# Patient Record
Sex: MALE | Race: BLACK OR AFRICAN AMERICAN | NOT HISPANIC OR LATINO | Employment: FULL TIME | URBAN - METROPOLITAN AREA
[De-identification: names, ages, dates, MRNs, and addresses within clinical notes are randomized per-mention and may not be internally consistent; named-entity substitution may affect disease eponyms.]

---

## 2021-01-18 ENCOUNTER — IMMUNIZATION (OUTPATIENT)
Dept: VACCINE CLINIC | Facility: HOSPITAL | Age: 61
End: 2021-01-18

## 2021-01-18 PROCEDURE — 0001A: CPT

## 2021-01-18 PROCEDURE — 91300 HC SARSCOV02 VAC 30MCG/0.3ML IM: CPT

## 2021-02-08 ENCOUNTER — IMMUNIZATION (OUTPATIENT)
Dept: VACCINE CLINIC | Facility: HOSPITAL | Age: 61
End: 2021-02-08

## 2021-02-08 PROCEDURE — 91300 HC SARSCOV02 VAC 30MCG/0.3ML IM: CPT | Performed by: INTERNAL MEDICINE

## 2021-02-08 PROCEDURE — 0002A: CPT | Performed by: INTERNAL MEDICINE

## 2022-03-04 PROCEDURE — U0004 COV-19 TEST NON-CDC HGH THRU: HCPCS | Performed by: FAMILY MEDICINE

## 2022-05-24 ENCOUNTER — OFFICE VISIT (OUTPATIENT)
Dept: ORTHOPEDIC SURGERY | Facility: CLINIC | Age: 62
End: 2022-05-24

## 2022-05-24 VITALS
SYSTOLIC BLOOD PRESSURE: 120 MMHG | BODY MASS INDEX: 25.45 KG/M2 | WEIGHT: 192 LBS | HEIGHT: 73 IN | DIASTOLIC BLOOD PRESSURE: 72 MMHG

## 2022-05-24 DIAGNOSIS — M25.511 RIGHT SHOULDER PAIN, UNSPECIFIED CHRONICITY: Primary | ICD-10-CM

## 2022-05-24 DIAGNOSIS — M19.011 ARTHRITIS OF RIGHT ACROMIOCLAVICULAR JOINT: ICD-10-CM

## 2022-05-24 PROCEDURE — 99203 OFFICE O/P NEW LOW 30 MIN: CPT | Performed by: ORTHOPAEDIC SURGERY

## 2022-05-24 NOTE — PROGRESS NOTES
[facility] [place] [facility] Note        Subjective     Pain of the Right Shoulder      HPI      Alvin Bell is a 62 y.o. male who presents with new problem of: right shoulder pain.  Onset: atraumatic and gradual in nature. The issue has been ongoing for 3 month(s). Pain is a 5/10 on the pain scale. Pain is described as dull and aching. Associated symptoms include pain, popping, grinding and stiffness. The pain is worse with any movement of the joint; resting improve the pain. Previous treatments have included: physical therapy.    I have reviewed the following portions of the patient's history:History of Present Illness and review of systems.      Patient is here today for evaluation of his right shoulder.  This has bothered him since January February.  During COVID, he moved his workouts to his home gym rather than going to the gym with a .  He is gradually lost the ability to do push-ups without pain.  He is able to do pull-ups and curls without difficulty.  He has no difficulty at night.  He tried some joint supplement for about a week and this has not helped him very much.  He has no pain at rest.  He says what really got his shoulder flared up was doing flies on his back.  He is able to play golf without difficulty.      History reviewed. No pertinent past medical history.   History reviewed. No pertinent surgical history.   Family History   Problem Relation Age of Onset   • Hypertension Mother    • Diabetes Father      Social History     Socioeconomic History   • Marital status:    Tobacco Use   • Smoking status: Never Smoker   • Smokeless tobacco: Never Used   Vaping Use   • Vaping Use: Never used   Substance and Sexual Activity   • Alcohol use: Yes   • Drug use: Never   • Sexual activity: Defer      No current outpatient medications on file prior to visit.     No current facility-administered medications on file prior to visit.      No Known Allergies       Review of Systems  "  Constitutional: Negative.    HENT: Negative.    Eyes: Negative.    Respiratory: Negative.    Cardiovascular: Negative.    Gastrointestinal: Negative.    Endocrine: Negative.    Genitourinary: Negative.    Musculoskeletal: Positive for arthralgias.   Skin: Negative.    Allergic/Immunologic: Negative.    Neurological: Negative.    Hematological: Negative.    Psychiatric/Behavioral: Negative.         I reviewed the patient's chief complaint, history of present illness, review of systems, past medical history, surgical history, family history, social history, medications and allergy list.        Objective      Physical Exam  /72   Ht 185.4 cm (72.99\")   Wt 87.1 kg (192 lb)   BMI 25.34 kg/m²     Body mass index is 25.34 kg/m².    General  Mental Status - alert  General Appearance - cooperative, well groomed, not in acute distress  Orientation - Oriented X3  Build & Nutrition - well developed and well nourished  Posture - normal posture  Gait - normal gait       Ortho Exam  Musculoskeletal   Upper Extremity   Right Shoulder     Inspection and Palpation:     Medial border scapular tenderness-none    AC Joint Tenderness -none    Sensation is normal    Examination reveals no ecchymosis.        Strength and Tone:    Supraspinatus - 5/5 without pain    External Rotators-5/5 without pain    Infraspinatus - 5/5    Subscapularis - 5/5    Deltoid - 5/5     Range of Motion      RightShoulder:    Internal Rotation: ROM - L4    External Rotation: AROM - 60 degrees    Elevation through flexion: AROM - 140 degrees        Impingement   Right shoulder    Moses-Jay impingement test positive    Neer impingement test positive     Functional Testing   Right shoulder    AC crossover adduction test positive    Speeds test negative    Uppercut test negative    O'Briens test negative    Drop arm sign negative    Apprehension relocation negative      Imaging/Studies  Imaging Results (Last 24 Hours)     Procedure Component Value " Units Date/Time    XR Shoulder 2+ View Right [[id number]] Resulted: 05/24/22 0821     Updated: 05/24/22 0822    Narrative:      Right Shoulder X-Ray    Indication: Pain    Study:  AP, axillary lateral, and scapular Y views    Comparison: None    Findings:  No acute fractures are visualized  No bony lesions are visualized.  Normal soft tissue appearance  AC joint: Severe joint space narrowing  Glenohumeral joint: Minimal joint space narrowing  Acromion type: 3      Impression:    No acute bony abnormalities noted  Type III acromion  Severe AC joint arthritis              Assessment    Assessment:  1. Right shoulder pain, unspecified chronicity    2. Arthritis of right acromioclavicular joint        Plan:  1. Continue over-the-counter medication as needed for discomfort  2. We shared the patient's radiographic findings with him.  He does have a type III acromion and is at high risk of having rotator cuff pathology.  With that being said, I do believe the majority of his pain given his history and subjective complaints are coming from the AC joint and his AC joint arthritis which is notable on his x-ray today as well.  Plan will be for physical therapy to work on cuff strengthening and global shoulder strengthening as well as iontophoresis to the AC joint.  We will recheck him in 4 weeks and if he is a lot better consider modalities versus advanced imaging.        James Fitzpatrick MD  05/24/22  08:38 EDT      Dictated Utilizing Dragon Dictation.

## 2022-06-02 ENCOUNTER — TREATMENT (OUTPATIENT)
Dept: PHYSICAL THERAPY | Facility: CLINIC | Age: 62
End: 2022-06-02

## 2022-06-02 DIAGNOSIS — M25.511 ACUTE PAIN OF RIGHT SHOULDER: Primary | ICD-10-CM

## 2022-06-02 DIAGNOSIS — M25.60 RANGE OF MOTION DEFICIT: ICD-10-CM

## 2022-06-02 DIAGNOSIS — R53.1 WEAKNESS: ICD-10-CM

## 2022-06-02 PROCEDURE — 97162 PT EVAL MOD COMPLEX 30 MIN: CPT | Performed by: PHYSICAL THERAPIST

## 2022-06-02 PROCEDURE — 97110 THERAPEUTIC EXERCISES: CPT | Performed by: PHYSICAL THERAPIST

## 2022-06-02 NOTE — PROGRESS NOTES
Physical Therapy Initial Evaluation and Plan of Care    Patient: Alvin Bell   : 1960  Diagnosis/ICD-10 Code:  No primary diagnosis found.  Referring practitioner: James Fitzpatrick,*  Date of Initial Visit: 2022  Today's Date: 2022  Patient seen for 1 session         Visit Diagnoses:  No diagnosis found.      Subjective Questionnaire: QuickDASH: 18      Subjective Evaluation    History of Present Illness  Mechanism of injury: The pt stated that he has been working out 3x/week for the last decade. He began working out at home at the beginning of the pandemic and has gradually developed R shoulder pain. He noticed at the beginning of the year that he was unable to perform a pushup due to the pain so he sought treatment from a massage therapist and notified his . Unfortunately, he continues to have unchanged pain. He saw Dr. Fitzpatrick who diagnosed AC jt dysfunction.    Pain is worsened with swinging his backpack on his shoulder, with hitting a golf ball, and with any pressing activities. Symptoms are improved with rest. He has attempted to manage pain with massage therapy. He performs yoga once a week and uses a foam roller regularly.     He has discontinued pushups and flies for the last month but does not feel this has improved his pain. He hopes to return to chest strengthening exercises.       Patient Occupation: Works at [employer] -   Pain  Current pain ratin  At best pain ratin  At worst pain ratin  Location: R shoulder   Quality: dull ache  Relieving factors: rest and change in position  Aggravating factors: lifting, overhead activity and outstretched reach  Progression: no change    Social Support  Lives with: spouse    Hand dominance: right    Diagnostic Tests  X-ray: abnormal (severe AC jt OA)    Treatments  No previous or current treatments  Patient Goals  Patient goals for therapy: decreased pain, increased motion, increased strength  and return to sport/leisure activities             Objective          Palpation   Left   No palpable tenderness to the subscapularis and upper trapezius.     Right   No palpable tenderness to the subscapularis and upper trapezius.   Hypertonic in the subscapularis and upper trapezius.     Tenderness     Additional Tenderness Details  No TTP in R shoulder    Active Range of Motion   Left Shoulder   Flexion: 152 degrees   Abduction: 158 degrees   External rotation 0°: 84 degrees   Internal rotation BTB: T7     Right Shoulder   Flexion: 137 degrees   Abduction: 150 degrees   External rotation 0°: 82 degrees   Internal rotation BTB: T7     Strength/Myotome Testing     Left Shoulder     Planes of Motion   Flexion: 5   Abduction: 5   External rotation at 0°: 5   Internal rotation at 0°: 5   Internal rotation at 90°: 5     Right Shoulder     Planes of Motion   Flexion: 4   Abduction: 4+   External rotation at 0°: 4   Internal rotation at 0°: 4   Internal rotation at 90°: 4-     Additional Strength Details  Pain with R shoulder flexion, abduction, and IR at 90 deg    Tests     Right Shoulder   Positive Neer's and passive horizontal adduction.   Negative empty can, full can, Hawkin's and lift-off.           Assessment & Plan     Assessment  Impairments: abnormal muscle firing, abnormal or restricted ROM, impaired physical strength, lacks appropriate home exercise program and pain with function  Functional Limitations: carrying objects, lifting, reaching behind back, reaching overhead and unable to perform repetitive tasks  Assessment details: The patient is a 61 yo male who presented to PT with evolving characteristics of subacute R shoulder pain with low complexity. Signs and symptoms are consistent with AC jt OA and possible underlying cuff pathology. His subscapularis is most likely to be affected, as it assists with the painful activities such as push ups and flies and was painful with activation in the clinic. He has no  TTP. The condition appears to be inflammatory to some degree so he was advised on ice, topical creams, and NSAID use. He was prescribed an HEP for cuff isometrics, CKC AROM, and serratus strengthening. I expect the patient to make a timely recovery with skilled PT intervention.     Prognosis: good    Goals  Plan Goals: Short Term Goals (4 weeks):     1. The patient will be independent and compliant with initial HEP.     2. The patient will report pain at rest 0/10 or less and worst pain 3/10 or less.    3. The patient will display decreased mm tension in the UT.    4.  R shoulder AROM will improve to flex 145 deg, abd 155 deg, ER 82 deg, IR T7 deg.    5. The patient will demonstrate inc strength evidenced by MMT as follows: flex 5/5, abd 5/5, ER 5/5, and IR 5/5.    6. Quick DASH will improve by 11 points or more.         Long Term Goals (8 weeks):     1. The patient will be appropriate for independent management and compliant with progressed HEP.     2. The patient will report pain at rest 0/10 or less and worst pain 1/10 or less.    3. R shoulder AROM will be within 5 degrees of the contralateral side in flex, abd, ER, and IR.    4. The patient will return to work duties and/or ADLs with no limitations due to shoulder pain or dysfunction.    5. The patient will return to recreational and community activities with no limitations due to shoulder pain or dysfunction.      Plan  Therapy options: will be seen for skilled therapy services  Planned modality interventions: cryotherapy, iontophoresis, TENS, electrical stimulation/Russian stimulation and thermotherapy (hydrocollator packs)  Planned therapy interventions: ADL retraining, body mechanics training, flexibility, functional ROM exercises, home exercise program, joint mobilization, manual therapy, neuromuscular re-education, postural training, soft tissue mobilization, strengthening, therapeutic activities and stretching  Frequency: 1x week  Duration in visits:  8  Duration in weeks: 8  Treatment plan discussed with: patient  Plan details: The patient will likely benefit from TE/TA/NMED to improve RC strength, UE proprioception, and scapular mobility. MT will be utilized in addition to stretching for improved GH jt mobility and AROM. Modalities will be used as needed for pain modulation and reduction of swelling. Dry needling as indicated. Consider ionto.        History # of Personal Factors and/or Comorbidities: LOW (0)  Examination of Body System(s): # of elements: LOW (1-2)  Clinical Presentation: EVOLVING  Clinical Decision Making: LOW       Timed:         Manual Therapy:    0     mins  26314;     Therapeutic Exercise:    25     mins  37054;     Neuromuscular Angélica:    0    mins  51225;    Therapeutic Activity:     0     mins  97828;     Gait Trainin     mins  30417;     Ultrasound:     0     mins  22730;    Ionto                               0    mins   95315  Self Care                       0     mins   94096  Canalith Repos    0     mins 68136      Un-Timed:  Electrical Stimulation:    0     mins  29693 (MC );  Dry Needling     0     mins self-pay  Traction     0     mins 85046  Low Eval     20     Mins  91853  Mod Eval     0     Mins  98155  High Eval                       0     Mins  06483        Timed Treatment:   25   mins   Total Treatment:     45   mins          PT: Beni Moreno PT     License Number: [id number]  Electronically signed by Beni Moreno PT, 22, 3:38 PM EDT    Certification Period: 2022 thru 2022  I certify that the therapy services are furnished while this patient is under my care.  The services outlined above are required by this patient, and will be reviewed every 90 days.         Physician Signature:__________________________________________________    PHYSICIAN: James Fitzpatrick MD  NPI: [id number]                                      DATE:      Please sign and return via fax to [email or web address] . Thank you,  [facility].

## 2022-06-09 ENCOUNTER — TREATMENT (OUTPATIENT)
Dept: PHYSICAL THERAPY | Facility: CLINIC | Age: 62
End: 2022-06-09

## 2022-06-09 DIAGNOSIS — R53.1 WEAKNESS: ICD-10-CM

## 2022-06-09 DIAGNOSIS — M25.60 RANGE OF MOTION DEFICIT: ICD-10-CM

## 2022-06-09 DIAGNOSIS — M25.511 ACUTE PAIN OF RIGHT SHOULDER: Primary | ICD-10-CM

## 2022-06-09 PROCEDURE — 97033 APP MDLTY 1+IONTPHRSIS EA 15: CPT | Performed by: PHYSICAL THERAPIST

## 2022-06-09 PROCEDURE — 97140 MANUAL THERAPY 1/> REGIONS: CPT | Performed by: PHYSICAL THERAPIST

## 2022-06-09 PROCEDURE — 97110 THERAPEUTIC EXERCISES: CPT | Performed by: PHYSICAL THERAPIST

## 2022-06-09 NOTE — PROGRESS NOTES
Physical Therapy Daily Treatment Note      Patient: Alvin Bell   : 1960  Referring practitioner: James Fitzpatrick,*  Date of Initial Visit: Type: THERAPY  Noted: 2022  Today's Date: 2022  Patient seen for 2 sessions       Visit Diagnoses:    ICD-10-CM ICD-9-CM   1. Acute pain of right shoulder  M25.511 719.41   2. Weakness  R53.1 780.79   3. Range of motion deficit  M25.60 719.50       Subjective Evaluation    History of Present Illness  Mechanism of injury: The pt stated that he overworked his shoulder this weekend doing yard work and playing golf. He has not attempted to ice or use NSAIDs. He has been compliant with his HEP and feels it is going well, but noted popping and clicking in the shoulder with rows.     Pain  Current pain ratin  Location: R shoulder           Objective   See Exercise, Manual, and Modality Logs for complete treatment.       Assessment & Plan     Assessment    Assessment details: The pt experienced an increase in pain with activity this week but pain had returned to baseline by today. He demonstrated limited ROM secondary to joint stiffness and mm tightness upon presentation but responded very well to MT and stretching and had full AROM by the end of treatment. He was prescribed general cuff strengthening exercises today and was advised to perform high reps at home.     Plan  Plan details: Continue cuff strengthening, MT, and progress SA exercises.           Timed:         Manual Therapy:    25     mins  62539;     Therapeutic Exercise:    25     mins  07723;     Neuromuscular Angélica:    0    mins  01137;    Therapeutic Activity:     0     mins  22864;     Gait Trainin     mins  55781;     Ultrasound:     0     mins  91720;    Ionto                               8    mins   68027  Self Care                       0     mins   78990  Canalith Repos    0     mins 86165      Un-Timed:  Electrical Stimulation:    0     mins  17470 ( );  Dry Needling      0     mins self-pay  Traction     0     mins 54227      Timed Treatment:   58   mins   Total Treatment:     58   mins    Beni Moreno, PT  [place] License: [id number]

## 2022-06-13 ENCOUNTER — TREATMENT (OUTPATIENT)
Dept: PHYSICAL THERAPY | Facility: CLINIC | Age: 62
End: 2022-06-13

## 2022-06-13 DIAGNOSIS — M25.60 RANGE OF MOTION DEFICIT: ICD-10-CM

## 2022-06-13 DIAGNOSIS — M25.511 ACUTE PAIN OF RIGHT SHOULDER: Primary | ICD-10-CM

## 2022-06-13 DIAGNOSIS — R53.1 WEAKNESS: ICD-10-CM

## 2022-06-13 PROCEDURE — 97140 MANUAL THERAPY 1/> REGIONS: CPT | Performed by: PHYSICAL THERAPIST

## 2022-06-13 PROCEDURE — 97110 THERAPEUTIC EXERCISES: CPT | Performed by: PHYSICAL THERAPIST

## 2022-06-13 PROCEDURE — 97033 APP MDLTY 1+IONTPHRSIS EA 15: CPT | Performed by: PHYSICAL THERAPIST

## 2022-06-13 NOTE — PROGRESS NOTES
Physical Therapy Daily Treatment Note      Patient: Alvin Bell   : 1960  Referring practitioner: James Fitzpatrick,*  Date of Initial Visit: Type: THERAPY  Noted: 2022  Today's Date: 2022  Patient seen for 3 sessions       Visit Diagnoses:    ICD-10-CM ICD-9-CM   1. Acute pain of right shoulder  M25.511 719.41   2. Weakness  R53.1 780.79   3. Range of motion deficit  M25.60 719.50       Subjective Evaluation    History of Present Illness  Mechanism of injury: The pt stated that his shoulder pain has been minimal in the last week and reported that he was able to golf and lift weights with little restriction. He did have some discomfort and tightness with more advanced lifts but reported he held off on most overhead activity. He felt he responded well to ionto last visit.     Pain  Current pain ratin  At worst pain ratin  Location: R shoulder           Objective          Active Range of Motion     Right Shoulder   Flexion: 146 degrees   Abduction: 160 degrees       See Exercise, Manual, and Modality Logs for complete treatment.       Assessment & Plan     Assessment    Assessment details: The pt responded well to MT, cuff strengthening, and ionto last visit and saw reduced pain with activity during the week. He continued to exhibit tightness of the posterior corner but stretching and STM resulted in a quick improvement in ROM and reduced mm tension. He was challenged with more advanced cuff and SA strengthening today and was encouraged to increase reps of HEP exercises at home within his tolerance. Ionto was repeated.     Plan  Plan details: Continue progressions of cuff strengthening, MT, and stretching. Consider ionto.          Timed:         Manual Therapy:    15     mins  65225;     Therapeutic Exercise:    25     mins  03813;     Neuromuscular Angélica:    0    mins  23708;    Therapeutic Activity:     0     mins  69092;     Gait Trainin     mins  05551;     Ultrasound:      0     mins  25389;    Ionto                               8    mins   49590  Self Care                       0     mins   86381  Canalith Repos    0     mins 98035      Un-Timed:  Electrical Stimulation:    0     mins  29983 ( );  Dry Needling     0     mins self-pay  Traction     0     mins 28995      Timed Treatment:   48   mins   Total Treatment:     48   mins    Beni Moreno, PT  [place] License: [id number]

## 2022-06-20 ENCOUNTER — TREATMENT (OUTPATIENT)
Dept: PHYSICAL THERAPY | Facility: CLINIC | Age: 62
End: 2022-06-20

## 2022-06-20 DIAGNOSIS — M25.60 RANGE OF MOTION DEFICIT: ICD-10-CM

## 2022-06-20 DIAGNOSIS — M25.511 ACUTE PAIN OF RIGHT SHOULDER: Primary | ICD-10-CM

## 2022-06-20 DIAGNOSIS — R53.1 WEAKNESS: ICD-10-CM

## 2022-06-20 PROCEDURE — 97140 MANUAL THERAPY 1/> REGIONS: CPT | Performed by: PHYSICAL THERAPIST

## 2022-06-20 PROCEDURE — 97110 THERAPEUTIC EXERCISES: CPT | Performed by: PHYSICAL THERAPIST

## 2022-06-20 PROCEDURE — 97033 APP MDLTY 1+IONTPHRSIS EA 15: CPT | Performed by: PHYSICAL THERAPIST

## 2022-06-21 NOTE — PROGRESS NOTES
Physical Therapy Daily Treatment Note      Patient: Alvin Bell   : 1960  Referring practitioner: James Fitzpatrick,*  Date of Initial Visit: Type: THERAPY  Noted: 2022  Today's Date: 2022  Patient seen for 4 sessions       Visit Diagnoses:    ICD-10-CM ICD-9-CM   1. Acute pain of right shoulder  M25.511 719.41   2. Weakness  R53.1 780.79   3. Range of motion deficit  M25.60 719.50       Subjective Evaluation    History of Present Illness  Mechanism of injury: The pt stated that his shoulder had been feeling much better until last night when he tried to lift a fire pit. He experienced a painful pop in his shoulder but stated that pain dissipated by the morning. He was able to complete his personal training session today with no limitations. He continues to report pain with abduction over shoulder height.     Pain  Current pain ratin  Location: R shoulder           Objective   See Exercise, Manual, and Modality Logs for complete treatment.       Assessment & Plan     Assessment    Assessment details: The pt experienced an exacerbation of pain with lifting yesterday but this had returned to baseline by today. He tolerated overhead strengthening well with no complaints of pain in the clinic and should continue to see improved pain and function with ongoing strengthening. Ionto was applied to the AC joint.     Plan  Plan details: Assess response to ionto. Progress cuff strengthening through full ROM as tolerated.          Timed:         Manual Therapy:    15     mins  26409;     Therapeutic Exercise:    30     mins  26841;     Neuromuscular Angélica:    0    mins  55702;    Therapeutic Activity:     0     mins  58495;     Gait Trainin     mins  70342;     Ultrasound:     0     mins  99119;    Ionto                               8    mins   74479  Self Care                       0     mins   49768  Canalith Repos    0     mins 23310      Un-Timed:  Electrical Stimulation:    0     mins   67812 ( );  Dry Needling     0     mins self-pay  Traction     0     mins 00071      Timed Treatment:   53   mins   Total Treatment:     53   mins    Beni Moreno, PT  [place] License: [id number]

## 2022-06-27 ENCOUNTER — TREATMENT (OUTPATIENT)
Dept: PHYSICAL THERAPY | Facility: CLINIC | Age: 62
End: 2022-06-27

## 2022-06-27 DIAGNOSIS — M25.60 RANGE OF MOTION DEFICIT: ICD-10-CM

## 2022-06-27 DIAGNOSIS — M25.511 ACUTE PAIN OF RIGHT SHOULDER: Primary | ICD-10-CM

## 2022-06-27 DIAGNOSIS — R53.1 WEAKNESS: ICD-10-CM

## 2022-06-27 PROCEDURE — 97110 THERAPEUTIC EXERCISES: CPT | Performed by: PHYSICAL THERAPIST

## 2022-06-27 PROCEDURE — 97140 MANUAL THERAPY 1/> REGIONS: CPT | Performed by: PHYSICAL THERAPIST

## 2022-06-27 NOTE — PROGRESS NOTES
Physical Therapy Re Certification Of Plan of Care  Patient: Alvin Bell   : 1960  Diagnosis/ICD-10 Code:  Acute pain of right shoulder [M25.511]  Referring practitioner: James Fitzpatrick,*  Date of Initial Visit: Type: THERAPY  Noted: 2022  Today's Date: 2022  Patient seen for 5 sessions         Visit Diagnoses:    ICD-10-CM ICD-9-CM   1. Acute pain of right shoulder  M25.511 719.41   2. Weakness  R53.1 780.79   3. Range of motion deficit  M25.60 719.50       Subjective Questionnaire: QuickDASH: 15  Clinical Progress: improved  Home Program Compliance: Yes  Treatment has included: therapeutic exercise, neuromuscular re-education, manual therapy, therapeutic activity and iontophoresis      Subjective Evaluation    History of Present Illness  Mechanism of injury: The pt stated that his shoulder has not bothered him much in the last week and he has tolerated personal training sessions well. He had some pain with [place] his luggage [place] the airport over the weekend. He feels he is about 15% better compared to 1 month ago. He continues to report pain with reaching behind him into extension and with overhead activity. He will be seeing Dr. Fitzpatrick tomorrow.     Pain  Current pain ratin  At worst pain rating: 3  Location: R shoulder             Objective          Palpation   Left   No palpable tenderness to the subscapularis and upper trapezius.     Right   No palpable tenderness to the subscapularis and upper trapezius.   Hypertonic in the subscapularis and upper trapezius.     Tenderness     Additional Tenderness Details  No TTP in R shoulder    Active Range of Motion   Left Shoulder   Flexion: 152 degrees   Abduction: 158 degrees   External rotation 0°: 84 degrees   Internal rotation BTB: T7     Right Shoulder   Flexion: 155 degrees   Abduction: 160 degrees   External rotation 0°: 82 degrees   Internal rotation BTB: T7     Strength/Myotome Testing     Left Shoulder     Planes of  Motion   Flexion: 5   Abduction: 5   External rotation at 0°: 5   Internal rotation at 0°: 5   Internal rotation at 90°: 5     Right Shoulder     Planes of Motion   Flexion: 5   Abduction: 5   External rotation at 0°: 4+   Internal rotation at 0°: 4   Internal rotation at 90°: 4-     Additional Strength Details  Pain with R shoulder flexion, abduction, and IR at 90 deg    Tests     Right Shoulder   Positive lift-off and passive horizontal adduction.   Negative empty can, full can, Hawkin's, internal rotation lag sign and Neer's.           Assessment & Plan     Assessment  Impairments: abnormal muscle firing, abnormal or restricted ROM, impaired physical strength, lacks appropriate home exercise program and pain with function  Functional Limitations: carrying objects, lifting, reaching behind back, reaching overhead and unable to perform repetitive tasks  Assessment details: The patient has made fair progress with initial PT interventions but he continues to report pain with overhead activity, reaching into extension, and with pec strengthening exercises. Symptoms remain consistent with subscapularis pathology along with local irritation to the AC joint. He has responded well in the short term to ionto over the AC joint and may benefit from additional anti-inflammatory intervention moving forward. The subscap is weak and irritable, particularly in a 90/90 testing position, and has not been challenged much with exercise thus far due to complaints of pain. AROM has improved but he continues to display tightness of the posterior corner that is quickly improved with MT and stretching. He may benefit from a period of rest if symptoms persist, as he has remained active with personal training along with HEP performance and may be overworking the shoulder.   Prognosis: good    Goals  Plan Goals: Short Term Goals (4 weeks):     1. The patient will be independent and compliant with initial HEP. Met    2. The patient will  report pain at rest 0/10 or less and worst pain 3/10 or less. Met    3. The patient will display decreased mm tension in the UT. Ongoing     4.  R shoulder AROM will improve to flex 145 deg, abd 155 deg, ER 82 deg, IR T7 deg. Met     5. The patient will demonstrate inc strength evidenced by MMT as follows: flex 5/5, abd 5/5, ER 5/5, and IR 5/5. Ongoing     6. Quick DASH will improve by 11 points or more. Ongoing         Long Term Goals (8 weeks):     1. The patient will be appropriate for independent management and compliant with progressed HEP. Ongoing    2. The patient will report pain at rest 0/10 or less and worst pain 1/10 or less. Ongoing    3. R shoulder AROM will be within 5 degrees of the contralateral side in flex, abd, ER, and IR. Met    4. The patient will return to work duties and/or ADLs with no limitations due to shoulder pain or dysfunction. Ongoing    5. The patient will return to recreational and community activities with no limitations due to shoulder pain or dysfunction. Ongoing       Plan  Therapy options: will be seen for skilled therapy services  Planned modality interventions: cryotherapy, iontophoresis, TENS, electrical stimulation/Russian stimulation and thermotherapy (hydrocollator packs)  Planned therapy interventions: ADL retraining, body mechanics training, flexibility, functional ROM exercises, home exercise program, joint mobilization, manual therapy, neuromuscular re-education, postural training, soft tissue mobilization, strengthening, therapeutic activities and stretching  Frequency: 1x week  Duration in visits: 8  Duration in weeks: 8  Treatment plan discussed with: patient  Plan details: Assess response to rest and resume strengthening of the cuff through full range. Begin subscap training.           Recommendations: Continue with recommendations return to ortho  Timeframe: 1 month  Prognosis to achieve goals: fair      Timed:         Manual Therapy:    20     mins  28940;      Therapeutic Exercise:    25     mins  69783;     Neuromuscular Angélica:    0    mins  34618;    Therapeutic Activity:     0     mins  26026;     Gait Trainin     mins  32059;     Ultrasound:     0     mins  14248;    Ionto                               0    mins   98733  Self Care                       0     mins   47279  Canalith Repos    0     mins 11872      Un-Timed:  Electrical Stimulation:    0     mins  02848 ( );  Dry Needling     0     mins self-pay  Traction     0     mins 77730  Re-Eval                           0    mins  00630      Timed Treatment:   45   mins   Total Treatment:     45   mins          PT: Beni Moreno PT     [place] License:  [id number]    Electronically signed by Beni Moreno PT, 22, 3:34 PM EDT    Certification Period: 2022 thru 2022  I certify that the therapy services are furnished while this patient is under my care.  The services outlined above are required by this patient, and will be reviewed every 90 days.         Physician Signature:__________________________________________________    PHYSICIAN: James Fitzpatrick MD  NPI: [id number]                                      DATE:  :     Please sign and return via fax to [email or web address] . Thank you, [facility]

## 2022-06-28 ENCOUNTER — OFFICE VISIT (OUTPATIENT)
Dept: ORTHOPEDIC SURGERY | Facility: CLINIC | Age: 62
End: 2022-06-28

## 2022-06-28 VITALS
BODY MASS INDEX: 25.31 KG/M2 | SYSTOLIC BLOOD PRESSURE: 160 MMHG | WEIGHT: 191 LBS | HEIGHT: 73 IN | DIASTOLIC BLOOD PRESSURE: 95 MMHG

## 2022-06-28 DIAGNOSIS — M25.511 RIGHT SHOULDER PAIN, UNSPECIFIED CHRONICITY: Primary | ICD-10-CM

## 2022-06-28 DIAGNOSIS — M19.011 ARTHRITIS OF RIGHT ACROMIOCLAVICULAR JOINT: ICD-10-CM

## 2022-06-28 PROCEDURE — 99213 OFFICE O/P EST LOW 20 MIN: CPT | Performed by: ORTHOPAEDIC SURGERY

## 2022-06-28 NOTE — PROGRESS NOTES
"    [facility] [place] Orthopaedic Surgery Clinic Note        Subjective     CC: Follow-up (5 week recheck - Arthritis of right acromioclavicular joint )      MARIA EUGENIA Bell is a 62 y.o. male.  Patient returns for follow-up after his right shoulder.  He has been doing physical therapy and he continues to workout.  He says he is not really a lot much better or different.  Continues to hurt at the anterior aspect of the shoulder.  Has not been able to do push-ups.    Overall, patient's symptoms are as above.    ROS:    Constiutional:Pt denies fever, chills, nausea, or vomiting.  MSK:as above        Objective      Past Medical History  History reviewed. No pertinent past medical history.      Physical Exam  /95   Ht 185.4 cm (72.99\")   Wt 86.6 kg (191 lb)   BMI 25.20 kg/m²     Body mass index is 25.2 kg/m².    Patient is well nourished and well developed.        Ortho Exam  Musculoskeletal   Upper Extremity   Right Shoulder     Inspection and Palpation:     Medial border scapular tenderness-none    AC Joint Tenderness -mild to moderate    Sensation is normal    Examination reveals no ecchymosis.        Strength and Tone:    Supraspinatus -4-5 with pain    External Rotators-5/5    Infraspinatus - 5/5    Subscapularis - 5/5    Deltoid - 5/5     Range of Motion      RightShoulder:    Internal Rotation: ROM - L4    External Rotation: AROM - 60 degrees    Elevation through flexion: AROM - 140 degrees        Impingement   Right shoulder    Moses-Jay impingement test positive    Neer impingement test positive     Functional Testing   Right shoulder    AC crossover adduction test positive    Speeds test negative    Uppercut test negative    O'Briens test negative    Drop arm sign negative    Apprehension relocation negative      Imaging/Labs/EMG Reviewed:  Imaging Results (Last 24 Hours)     ** No results found for the last 24 hours. **            Assessment    Assessment:  1. Right shoulder pain, unspecified " chronicity    2. Arthritis of right acromioclavicular joint        Plan:  1. Recommend over the counter anti-inflammatories for pain and/or swelling  2. Right shoulder pain in the face of underlying AC arthritis--patient's examination and history are concerning for rotator cuff pathology.  Furthermore we know he has a fairly degenerative AC joint.  We will get an MRI of his right shoulder and I will see him back to review that study.  Pay close attention to the anterior structures.      James Fitzpatrick MD  06/28/22  15:16 EDT      Dictated Utilizing Dragon Dictation.

## 2022-07-13 ENCOUNTER — TREATMENT (OUTPATIENT)
Dept: PHYSICAL THERAPY | Facility: CLINIC | Age: 62
End: 2022-07-13

## 2022-07-13 DIAGNOSIS — M25.511 ACUTE PAIN OF RIGHT SHOULDER: Primary | ICD-10-CM

## 2022-07-13 DIAGNOSIS — R53.1 WEAKNESS: ICD-10-CM

## 2022-07-13 DIAGNOSIS — M25.60 RANGE OF MOTION DEFICIT: ICD-10-CM

## 2022-07-13 PROCEDURE — 97140 MANUAL THERAPY 1/> REGIONS: CPT | Performed by: PHYSICAL THERAPIST

## 2022-07-13 PROCEDURE — 97110 THERAPEUTIC EXERCISES: CPT | Performed by: PHYSICAL THERAPIST

## 2022-07-13 NOTE — PROGRESS NOTES
Physical Therapy Daily Treatment Note      Patient: Alvin Bell   : 1960  Referring practitioner: James Fitzpatrick,*  Date of Initial Visit: Type: THERAPY  Noted: 2022  Today's Date: 2022  Patient seen for 6 sessions       Visit Diagnoses:    ICD-10-CM ICD-9-CM   1. Acute pain of right shoulder  M25.511 719.41   2. Weakness  R53.1 780.79   3. Range of motion deficit  M25.60 719.50       Subjective Evaluation    History of Present Illness  Mechanism of injury: The pt stated that he was diagnosed with COVID shortly after his last visit and has been unable to exercise for the last two weeks as a result. He took NSAIDs and Tylenol as needed for his COVID symptoms and reported his shoulder was pain-free on those days. Otherwise, his shoulder pain has remained the same. He returned to Dr. Fitzpatrick and had an MRI scheduled for the first week of August. He has not yet returned to HEP exercise.    Pain  Current pain rating: 3  Location: R shoulder           Objective          Palpation     Right   Hypertonic in the subscapularis. Tenderness of the subscapularis.     Tenderness     Right Shoulder  Tenderness in the subscapularis tendon.     Strength/Myotome Testing     Right Shoulder     Isolated Muscles   Subscapularis: 4-       See Exercise, Manual, and Modality Logs for complete treatment.       Assessment & Plan     Assessment    Assessment details: The pt had COVID and was forced to rest for 2 weeks due to not feeling well. He did not notice any improvement in shoulder pain from rest alone but responded well to NSAID and Tylenol use, indicative of an inflammatory pathology. He continues to display TTP and weakness in the subscap so several isometric exercises were performed through various ranges today along with a single concentric shoulder IR exercises at 90/90. He will be having an MRI in a few weeks and prefers to manage independently with his HEP until that time. He was advised to take  rest days between the days her performs his HEP.     Plan  Plan details: POC as determined by ortho following MRI.           Timed:         Manual Therapy:    10     mins  63412;     Therapeutic Exercise:    30     mins  11775;     Neuromuscular Angélica:    0    mins  73505;    Therapeutic Activity:     0     mins  25900;     Gait Trainin     mins  37520;     Ultrasound:     0     mins  33915;    Ionto                               0    mins   09714  Self Care                       0     mins   07978  Canalith Repos    0     mins 70045      Un-Timed:  Electrical Stimulation:    0     mins  80844 ( );  Dry Needling     0     mins self-pay  Traction     0     mins 46829      Timed Treatment:   40   mins   Total Treatment:     40   mins    Beni Moreno, PT  [place] License: [id number]

## 2022-08-02 ENCOUNTER — HOSPITAL ENCOUNTER (OUTPATIENT)
Dept: MRI IMAGING | Facility: HOSPITAL | Age: 62
Discharge: HOME | End: 2022-08-02
Admitting: ORTHOPAEDIC SURGERY

## 2022-08-02 DIAGNOSIS — M25.511 RIGHT SHOULDER PAIN, UNSPECIFIED CHRONICITY: ICD-10-CM

## 2022-08-02 DIAGNOSIS — M19.011 ARTHRITIS OF RIGHT ACROMIOCLAVICULAR JOINT: ICD-10-CM

## 2022-08-02 PROCEDURE — 73221 MRI JOINT UPR EXTREM W/O DYE: CPT

## 2022-08-09 ENCOUNTER — TELEPHONE (OUTPATIENT)
Dept: ORTHOPEDIC SURGERY | Facility: CLINIC | Age: 62
End: 2022-08-09

## 2022-08-23 ENCOUNTER — OFFICE VISIT (OUTPATIENT)
Dept: ORTHOPEDIC SURGERY | Facility: CLINIC | Age: 62
End: 2022-08-23

## 2022-08-23 ENCOUNTER — TELEPHONE (OUTPATIENT)
Dept: ORTHOPEDIC SURGERY | Facility: CLINIC | Age: 62
End: 2022-08-23

## 2022-08-23 VITALS
SYSTOLIC BLOOD PRESSURE: 128 MMHG | WEIGHT: 189.4 LBS | HEIGHT: 73 IN | BODY MASS INDEX: 25.1 KG/M2 | DIASTOLIC BLOOD PRESSURE: 78 MMHG

## 2022-08-23 DIAGNOSIS — M25.511 RIGHT SHOULDER PAIN, UNSPECIFIED CHRONICITY: Primary | ICD-10-CM

## 2022-08-23 DIAGNOSIS — M75.111 INCOMPLETE TEAR OF RIGHT ROTATOR CUFF, UNSPECIFIED WHETHER TRAUMATIC: ICD-10-CM

## 2022-08-23 DIAGNOSIS — M19.011 ARTHRITIS OF RIGHT ACROMIOCLAVICULAR JOINT: ICD-10-CM

## 2022-08-23 DIAGNOSIS — M75.20 TENDINITIS OF LONG HEAD OF BICEPS: ICD-10-CM

## 2022-08-23 PROCEDURE — 99213 OFFICE O/P EST LOW 20 MIN: CPT | Performed by: ORTHOPAEDIC SURGERY

## 2022-08-23 NOTE — TELEPHONE ENCOUNTER
Patient needs a 2 month follow up for his right shoulder.  I attempted to call, but not released to leave a voicemail.  When he calls back, please schedule him for 2 month follow up from 08.23.2022.      Thank you,    Lisa MARES(R)

## 2022-08-23 NOTE — PROGRESS NOTES
"    [facility] [place] Orthopaedic Surgery Clinic Note        Subjective     CC: Follow-up (MRI follow up -- MRI done 8/2/22 )      MARIA EUGENIA Bell is a 62 y.o. male.  Patient returns after the MRI of his right shoulder.  Says overall, he has not worked out much lately and the shoulder has been feeling better.  He tells me he hurt his low back working out a few weeks ago.  This is kept him out of the gym.    Overall, patient's symptoms are as above.    ROS:    Constiutional:Pt denies fever, chills, nausea, or vomiting.  MSK:as above        Objective      Past Medical History  Past Medical History:   Diagnosis Date   • Frozen shoulder Left shoulder 2017   • Periarthritis of shoulder Right Shoulder 2022         Physical Exam  /78   Ht 185.4 cm (72.99\")   Wt 85.9 kg (189 lb 6.4 oz)   BMI 24.99 kg/m²     Body mass index is 24.99 kg/m².    Patient is well nourished and well developed.        Ortho Exam  Musculoskeletal   Upper Extremity   Right Shoulder       Strength and Tone:    Supraspinatus -4 out of 5 with some pain    External Rotators-5/5    Infraspinatus - 5/5    Subscapularis - 5/5    Deltoid - 5/5     Range of Motion      Right Shoulder:    Internal Rotation: ROM - L4    External Rotation: AROM - 70 degrees    Elevation through flexion: AROM - 140 degrees     AC joint:  non tender to palpation    AC joint:  negative crossover      Imaging/Labs/EMG Reviewed:  Imaging Results (Last 24 Hours)     ** No results found for the last 24 hours. **        MRI Shoulder Right Without Contrast  Narrative: EXAMINATION: MRI SHOULDER RIGHT WO CONTRAST-      INDICATION: Shoulder pain, rotator cuff disorder suspected, xray done;  M25.511-Pain in right shoulder; M19.011-Primary osteoarthritis, right  shoulder     TECHNIQUE: MRI of the right shoulder was obtained using standard imaging  sequences in three orthogonal imaging planes. No intravenous or  intra-articular contrast was used.     COMPARISON: Right shoulder radiographs " 5/24/2022     FINDINGS:      There is questionable low-grade articular-sided fraying of the anterior  mid distal fibers of the supraspinatus tendon just proximal to the  footprint (series 4 image 12). There is moderate-grade focal  interstitial tearing of the junctional fibers of the supraspinatus and  infraspinatus and anterior fibers of the infraspinatus tendon at the  footprint (series 8 image 13). There is tendinosis and questionable  low-grade articular-sided fraying of the superior and mid distal fibers  of the subscapularis tendon at the footprint (series 6 image 7 and  series 3 image 13).     The long head of biceps tendon is intact and normally positioned within  the intertubercular groove. There is mild increased central signal of  the intra-articular segment compatible with some tendinosis, without  evidence of discrete tear.     Normal alignment of the glenohumeral joint with a physiologic amount of  joint fluid. The glenohumeral articular cartilage is grossly preserved  without evidence of high-grade chondral loss, subchondral cystic change,  or subchondral marrow edema. There is some degenerative signal change of  the anterosuperior labrum.     There is moderate osteoarthritic change of the acromioclavicular joint  characterized by joint space loss, subchondral cystic change,  osteophytic change, and mild capsular hypertrophy. There is marrow edema  within the distal clavicle greater than acromion (series 4 image 9 and  series 3 image 4). A type II acromion is noted. Proliferative  degenerative changes of the AC joint exerts mild mass effect on the  underlying supraspinatus (series 4 image 11 and series 6 image 9). No  significant subacromial-subdeltoid bursal fluid. No os acromiale.     Impression:    Moderate osteoarthritic change of the acromial clavicular joint. There  is conspicuous marrow edema of the distal clavicle greater than acromion  about the AC joint, presumably reactive marrow edema from  degenerative  change although early distal clavicular osteolysis could have similar  appearance and correlate for trauma or repetitive stress/overuse.     Focal moderate-grade interstitial tearing of the junctional fibers of  the supraspinatus and infraspinatus tendon at the footprint. There may  be low-grade fraying of the subscapularis and supraspinatus tendons.     Mild intra-articular long head of biceps tendinosis.     This report was finalized on 8/3/2022 10:50 AM by Jamal Dykes MD.       We have reviewed images and report of the MRI above.  Patient has a couple of cuts on the coronal images where there is a greater than 75% partial-thickness cuff tear and then on the next cut it appears to be 50% involved.  These are the only issues that I see with the supraspinatus.  Subscap appears okay.  Long head of the biceps shows just a little bit of fluid.  There is significant edema at the AC joint.    Assessment    Assessment:  1. Right shoulder pain, unspecified chronicity    2. Arthritis of right acromioclavicular joint    3. Incomplete tear of right rotator cuff, unspecified whether traumatic    4. Tendinitis of long head of biceps        Plan:  1. Recommend over the counter anti-inflammatories for pain and/or swelling  2. Chronic right shoulder pain with AC joint arthritis and partial-thickness rotator cuff tear--size of the partial-thickness tear is overall very small and I think this can be managed nonoperatively.  We will hold off on further injections for now given how well he is doing.  We have counseled him about what he should and should not do in the gym.  I will see him back in a couple months we will see how he is doing overall.      James Fitzpatrick MD  08/23/22  17:31 EDT      Dictated Utilizing Dragon Dictation.

## 2022-09-30 ENCOUNTER — IMMUNIZATION (OUTPATIENT)
Dept: VACCINE CLINIC | Facility: HOSPITAL | Age: 62
End: 2022-09-30

## 2022-09-30 DIAGNOSIS — Z23 NEED FOR VACCINATION: Primary | ICD-10-CM

## 2022-09-30 PROCEDURE — 91312 HC SARSCOV2 VAC 30MCG/0.3ML IM BIVALENT BOOSTER 12 YRS AND OLDER: CPT | Performed by: HOSPITALIST

## 2022-09-30 PROCEDURE — 0124A: CPT | Performed by: HOSPITALIST

## 2022-10-25 ENCOUNTER — OFFICE VISIT (OUTPATIENT)
Dept: ORTHOPEDIC SURGERY | Facility: CLINIC | Age: 62
End: 2022-10-25

## 2022-10-25 VITALS
DIASTOLIC BLOOD PRESSURE: 88 MMHG | HEIGHT: 73 IN | BODY MASS INDEX: 25.05 KG/M2 | WEIGHT: 189 LBS | SYSTOLIC BLOOD PRESSURE: 150 MMHG

## 2022-10-25 DIAGNOSIS — M75.111 INCOMPLETE TEAR OF RIGHT ROTATOR CUFF, UNSPECIFIED WHETHER TRAUMATIC: ICD-10-CM

## 2022-10-25 DIAGNOSIS — M19.011 ARTHRITIS OF RIGHT ACROMIOCLAVICULAR JOINT: ICD-10-CM

## 2022-10-25 DIAGNOSIS — M25.511 RIGHT SHOULDER PAIN, UNSPECIFIED CHRONICITY: Primary | ICD-10-CM

## 2022-10-25 DIAGNOSIS — M75.20 TENDINITIS OF LONG HEAD OF BICEPS: ICD-10-CM

## 2022-10-25 PROCEDURE — 99213 OFFICE O/P EST LOW 20 MIN: CPT | Performed by: ORTHOPAEDIC SURGERY

## 2022-10-25 NOTE — PROGRESS NOTES
"    [facility] [place] [facility] [place]        Subjective     CC: Follow-up (Right shoulder pain)      MARIA EUGENIA Bell is a 62 y.o. male.  Patient returns for follow-up of his right shoulder pain.  He has a partial-thickness rotator cuff tear and AC joint arthritis.  He has modified his workouts at the gym.  This has helped him tremendously.  No injections have been done to date.    Overall, patient's symptoms are as above.    ROS:    Constiutional:Pt denies fever, chills, nausea, or vomiting.  MSK:as above        Objective      Past Medical History  Past Medical History:   Diagnosis Date   • Frozen shoulder Left shoulder 2017   • Periarthritis of shoulder Right Shoulder 2022         Physical Exam  /88   Ht 185.4 cm (72.99\")   Wt 85.7 kg (189 lb)   BMI 24.94 kg/m²     Body mass index is 24.94 kg/m².    Patient is well nourished and well developed.        Ortho Exam  Musculoskeletal   Upper Extremity   Right Shoulder       Strength and Tone:    Supraspinatus -5-5    External Rotators-5/5    Infraspinatus - 5/5    Subscapularis - 5/5    Deltoid - 5/5     Range of Motion      Right Shoulder:    Internal Rotation: ROM - L4    External Rotation: AROM - 70 degrees    Elevation through flexion: AROM - 140 degrees     AC joint:  non tender to palpation    AC joint:  negative crossover      Imaging/Labs/EMG Reviewed:  Imaging Results (Last 24 Hours)     ** No results found for the last 24 hours. **            Assessment    Assessment:  1. Right shoulder pain, unspecified chronicity    2. Arthritis of right acromioclavicular joint    3. Incomplete tear of right rotator cuff, unspecified whether traumatic    4. Tendinitis of long head of biceps        Plan:  1. Recommend over the counter anti-inflammatories for pain and/or swelling  2. Chronic right shoulder pain with AC joint arthritis and partial-thickness rotator cuff tear--patient is doing well overall.  I told him what to look out for and what to avoid at " the gym.  Certainly if he has worsening pain, he will call and we can work him in ASAP.  Otherwise, follow-up as needed going forward      James Fitzpatrick MD  10/25/22  12:48 EDT      Dictated Utilizing Dragon Dictation.

## 2024-02-26 ENCOUNTER — OFFICE VISIT (OUTPATIENT)
Dept: SLEEP MEDICINE | Facility: HOSPITAL | Age: 64
End: 2024-02-26
Payer: COMMERCIAL

## 2024-02-26 VITALS
HEIGHT: 73 IN | WEIGHT: 197.4 LBS | DIASTOLIC BLOOD PRESSURE: 84 MMHG | SYSTOLIC BLOOD PRESSURE: 155 MMHG | BODY MASS INDEX: 26.16 KG/M2 | HEART RATE: 62 BPM | OXYGEN SATURATION: 97 %

## 2024-02-26 DIAGNOSIS — G47.19 EXCESSIVE DAYTIME SLEEPINESS: ICD-10-CM

## 2024-02-26 DIAGNOSIS — G47.33 OBSTRUCTIVE SLEEP APNEA, ADULT: ICD-10-CM

## 2024-02-26 DIAGNOSIS — R06.83 SNORING: Primary | ICD-10-CM

## 2024-02-26 PROBLEM — E55.9 VITAMIN D DEFICIENCY: Status: ACTIVE | Noted: 2018-11-13

## 2024-02-26 PROBLEM — D64.9 ANEMIA: Status: ACTIVE | Noted: 2018-11-13

## 2024-02-26 PROBLEM — I10 ESSENTIAL HYPERTENSION: Status: ACTIVE | Noted: 2024-02-26

## 2024-02-26 PROCEDURE — 99204 OFFICE O/P NEW MOD 45 MIN: CPT | Performed by: INTERNAL MEDICINE

## 2024-02-26 RX ORDER — HYDROCHLOROTHIAZIDE 25 MG/1
1 TABLET ORAL DAILY
COMMUNITY
Start: 2023-11-27

## 2024-02-26 NOTE — PROGRESS NOTES
"  Alvin Bell is a 63 y.o. male.   Chief Complaint   Patient presents with    Sleeping Problem       HPI     63 y.o. male seen in consultation at the request of Tammy Butler APRN for evaluation of the above.     He is here primarily at the behest of his wife.  She described increasing snoring.  She notes that this is heavy and occurs primarily on his back.  She has also noted apneas.  This was related to his primary care nurse practitioner at [facility] and she referred the patient here.    He thinks he averages around 6 hours of sleep per night.  He admits to daytime somnolence.  He says that around 230 or 3 in the afternoon he gets very sleepy and has difficulty staying awake.  He will take a 20-minute nap as soon as he arrives home around 630.  He is in bed at night for around midnight to 630 or 7:30 in the morning.  He knows he should sleep longer but simply notes that he does not feel any better if he sleeps longer than he does.    He does not know any dry mouth or sore throat in the morning.  He has no morning headaches.  He has no RLS type symptoms.  He has no nocturnal hallucinations or sleep paralysis.  His wife does not describe any parasomnias.    [place] Scale is: 5/24    The patient's relevant past medical, surgical, family, and social history reviewed and updated in Epic as appropriate.    Current medications are:   Current Outpatient Medications:     hydroCHLOROthiazide 25 MG tablet, Take 1 tablet by mouth Daily., Disp: , Rfl: .    Review of Systems    Review of Systems  ROS documented in patient questionnaire ×14 systems.  Reviewed with patient.  Otherwise negative except as noted in HPI.    Physical Exam    Blood pressure 155/84, pulse 62, height 185.4 cm (73\"), weight 89.5 kg (197 lb 6.4 oz), SpO2 97%. Body mass index is 26.04 kg/m².    Physical Exam  Vitals and nursing note reviewed.   Constitutional:       Appearance: Normal appearance. He is well-developed.   HENT:      Head: " Normocephalic and atraumatic.      Nose: Nose normal.      Mouth/Throat:      Mouth: Mucous membranes are moist.      Pharynx: Oropharynx is clear. No oropharyngeal exudate.      Comments: Class I airway  Eyes:      General: No scleral icterus.     Conjunctiva/sclera: Conjunctivae normal.   Neck:      Thyroid: No thyromegaly.      Trachea: No tracheal deviation.   Cardiovascular:      Rate and Rhythm: Normal rate and regular rhythm.      Heart sounds: No murmur heard.     No friction rub. No gallop.   Pulmonary:      Effort: Pulmonary effort is normal. No respiratory distress.      Breath sounds: No wheezing or rales.   Musculoskeletal:         General: No deformity. Normal range of motion.   Skin:     General: Skin is warm and dry.      Findings: No rash.   Neurological:      Mental Status: He is alert and oriented to person, place, and time.   Psychiatric:         Behavior: Behavior normal.         Thought Content: Thought content normal.         DATA:    Reviewed 12/12/2023 note from [facility]     reviewed most recent labs dated 12/13/2023 including CBC TSH and CMP    ASSESSMENT:    Problem List Items Addressed This Visit    None  Visit Diagnoses       Snoring    -  Primary    Relevant Orders    Home Sleep Study    Obstructive sleep apnea, adult        Relevant Orders    Home Sleep Study    Excessive daytime sleepiness        Relevant Orders    Home Sleep Study            63-year-old male who presents to the sleep center with nonrestorative sleep and loud snoring.  Comorbidities include mild hypertension treated with a diuretic.  His risk for sleep apnea is increased based upon his presenting signs and symptoms, his gender, and age.  He warrants further evaluation for the likely presence of JUANITO and this could consist of home sleep apnea testing.  I discussed this with the patient in detail and he was agreeable to proceed as I have outlined below.    PLAN:    Home sleep apnea testing.  Diagnostic process  and potential treatment options discussed and questions/concerns addressed.  Long-term cardiovascular and metabolic risks of untreated obstructive sleep apnea reviewed with the patient.  He was agreeable to a trial of CPAP therapy on an initial trial basis if recommended after testing complete.  Sleep center follow-up.      I have reviewed the results of my evaluation and impression and discussed my recommendations in detail with the patient.    Signed by  Charly Liriano MD    February 26, 2024      CC: Tammy Butler APRN Soper, Erin, APRN

## 2024-03-05 ENCOUNTER — HOSPITAL ENCOUNTER (OUTPATIENT)
Dept: SLEEP MEDICINE | Facility: HOSPITAL | Age: 64
Discharge: HOME | End: 2024-03-05
Admitting: INTERNAL MEDICINE
Payer: COMMERCIAL

## 2024-03-05 VITALS — WEIGHT: 197 LBS | HEIGHT: 73 IN | BODY MASS INDEX: 26.11 KG/M2

## 2024-03-05 DIAGNOSIS — G47.19 EXCESSIVE DAYTIME SLEEPINESS: ICD-10-CM

## 2024-03-05 DIAGNOSIS — R06.83 SNORING: ICD-10-CM

## 2024-03-05 DIAGNOSIS — G47.33 OBSTRUCTIVE SLEEP APNEA, ADULT: ICD-10-CM

## 2024-03-05 PROCEDURE — 95800 SLP STDY UNATTENDED: CPT

## 2024-03-06 DIAGNOSIS — G47.33 OSA (OBSTRUCTIVE SLEEP APNEA): Primary | ICD-10-CM

## 2024-03-06 DIAGNOSIS — R06.83 SNORING: ICD-10-CM
